# Patient Record
Sex: MALE | Race: BLACK OR AFRICAN AMERICAN | NOT HISPANIC OR LATINO | Employment: OTHER | URBAN - METROPOLITAN AREA
[De-identification: names, ages, dates, MRNs, and addresses within clinical notes are randomized per-mention and may not be internally consistent; named-entity substitution may affect disease eponyms.]

---

## 2017-09-05 ENCOUNTER — HOSPITAL ENCOUNTER (EMERGENCY)
Facility: HOSPITAL | Age: 62
Discharge: NURSING, REHAB OR LONG-TERM CARE | End: 2017-09-05
Attending: EMERGENCY MEDICINE
Payer: MEDICAID

## 2017-09-05 VITALS
WEIGHT: 216 LBS | DIASTOLIC BLOOD PRESSURE: 69 MMHG | SYSTOLIC BLOOD PRESSURE: 113 MMHG | RESPIRATION RATE: 18 BRPM | HEART RATE: 66 BPM | TEMPERATURE: 98 F | OXYGEN SATURATION: 100 %

## 2017-09-05 DIAGNOSIS — M25.521 RIGHT ELBOW PAIN: ICD-10-CM
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
## 2017-09-05 DIAGNOSIS — M25.511 RIGHT SHOULDER PAIN: ICD-10-CM

## 2017-09-05 PROCEDURE — 99284 EMERGENCY DEPT VISIT MOD MDM: CPT

## 2017-09-05 PROCEDURE — 25000003 PHARM REV CODE 250: Performed by: EMERGENCY MEDICINE

## 2017-09-05 RX ORDER — POLYETHYLENE GLYCOL 3350 17 G/17G
POWDER, FOR SOLUTION ORAL
COMMUNITY

## 2017-09-05 RX ORDER — ACETAMINOPHEN 500 MG
2 TABLET ORAL
COMMUNITY

## 2017-09-05 RX ORDER — MELOXICAM 15 MG/1
15 TABLET ORAL DAILY
COMMUNITY

## 2017-09-05 RX ORDER — ESCITALOPRAM OXALATE 10 MG/1
10 TABLET ORAL DAILY
COMMUNITY

## 2017-09-05 RX ORDER — OXCARBAZEPINE 300 MG/1
300 TABLET, FILM COATED ORAL 2 TIMES DAILY
COMMUNITY

## 2017-09-05 RX ORDER — SYRING-NEEDL,DISP,INSUL,0.3 ML 29 G X1/2"
296 SYRINGE, EMPTY DISPOSABLE MISCELLANEOUS DAILY PRN
COMMUNITY

## 2017-09-05 RX ORDER — RIFAMPIN 300 MG/1
10 CAPSULE ORAL DAILY
COMMUNITY

## 2017-09-05 RX ORDER — NAPROXEN 500 MG/1
500 TABLET ORAL 2 TIMES DAILY
COMMUNITY

## 2017-09-05 RX ORDER — GABAPENTIN 400 MG/1
800 CAPSULE ORAL NIGHTLY
COMMUNITY

## 2017-09-05 RX ORDER — FINASTERIDE 5 MG/1
5 TABLET, FILM COATED ORAL DAILY
COMMUNITY

## 2017-09-05 RX ORDER — LATANOPROST 50 UG/ML
1 SOLUTION/ DROPS OPHTHALMIC NIGHTLY
COMMUNITY

## 2017-09-05 RX ORDER — LEVETIRACETAM 500 MG/1
500 TABLET ORAL 2 TIMES DAILY
COMMUNITY

## 2017-09-05 RX ORDER — LANOLIN ALCOHOL/MO/W.PET/CERES
100 CREAM (GRAM) TOPICAL DAILY
COMMUNITY

## 2017-09-05 RX ORDER — HYDROXYZINE HYDROCHLORIDE 50 MG/1
50 TABLET, FILM COATED ORAL NIGHTLY PRN
COMMUNITY

## 2017-09-05 RX ORDER — FUROSEMIDE 40 MG/1
40 TABLET ORAL 2 TIMES DAILY
COMMUNITY

## 2017-09-05 RX ORDER — QUETIAPINE FUMARATE 50 MG/1
50 TABLET, FILM COATED ORAL NIGHTLY
COMMUNITY

## 2017-09-05 RX ORDER — BACLOFEN 20 MG/1
20 TABLET ORAL 4 TIMES DAILY
COMMUNITY

## 2017-09-05 RX ORDER — ALENDRONATE SODIUM 70 MG/1
70 TABLET ORAL
COMMUNITY

## 2017-09-05 RX ORDER — BISACODYL 10 MG
10 SUPPOSITORY, RECTAL RECTAL DAILY PRN
COMMUNITY

## 2017-09-05 RX ORDER — CYCLOBENZAPRINE HCL 10 MG
10 TABLET ORAL 3 TIMES DAILY
COMMUNITY

## 2017-09-05 RX ORDER — HYDROCODONE BITARTRATE AND ACETAMINOPHEN 5; 325 MG/1; MG/1
1 TABLET ORAL
Status: COMPLETED | OUTPATIENT
Start: 2017-09-05 | End: 2017-09-05

## 2017-09-05 RX ORDER — HYDROXYZINE HYDROCHLORIDE 25 MG/1
25 TABLET, FILM COATED ORAL 4 TIMES DAILY PRN
COMMUNITY

## 2017-09-05 RX ORDER — GABAPENTIN 400 MG/1
400 CAPSULE ORAL 2 TIMES DAILY
COMMUNITY

## 2017-09-05 RX ORDER — ACETAMINOPHEN 325 MG/1
650 TABLET ORAL EVERY 4 HOURS PRN
COMMUNITY

## 2017-09-05 RX ADMIN — HYDROCODONE BITARTRATE AND ACETAMINOPHEN 1 TABLET: 5; 325 TABLET ORAL at 06:09

## 2017-09-05 NOTE — ED PROVIDER NOTES
Encounter Date: 9/5/2017       History     Chief Complaint   Patient presents with    Fall     HPI   61-year-old man from [place] presents for evaluation of fall.  Per EMS patient slid out of low lying bed and is complaining of some right shoulder and right elbow pain.  No head trauma.  No loss of consciousness.  Pain is intermittent only with moving of his contracted right extremity or raising of the shoulder.  Review of patient's allergies indicates:  No Known Allergies  Past Medical History:   Diagnosis Date    Anxiety     Aphasia     CHF (congestive heart failure)     Constipation     Depression     GERD (gastroesophageal reflux disease)     Heterotopic ossification     History of BPH     Insomnia     Neurogenic bowel     Neuropathic pain     Right hemiplegia     Seizures     Spinal stenosis     Stroke     Subdural hemorrhage     TB lung, latent      Past Surgical History:   Procedure Laterality Date    NECK SURGERY      right knee surger       History reviewed. No pertinent family history.  Social History   Substance Use Topics    Smoking status: Current Every Day Smoker     Packs/day: 1.00     Types: Cigarettes    Smokeless tobacco: Not on file    Alcohol use No     Review of Systems  REVIEW OF SYSTEMS  CONSTITUTIONAL: Negative for fever.  HEENT:  Negative for sore throat.   HEART:   Negative for chest pain..  LUNG:  Negative for shortness of breath.  HEME: Does not bruise/bleed easily.           Physical Exam     Initial Vitals [09/05/17 0228]   BP Pulse Resp Temp SpO2   (!) 128/92 76 18 97.6 °F (36.4 °C) 97 %      MAP       104         Physical Exam  Physical Exam   Nursing note and vitals reviewed.   Constitutional: . Appears well-developed and well-nourished.   HENT:   Head: Normocephalic and atraumatic.   Eyes: EOM are normal.   Neck: Normal range of motion. Neck supple.   Cardiovascular: Normal rate.   Pulmonary/Chest: Effort normal. Clear BS b/l   Abdominal: Soft, Non tender, no  distension.   Musculoskeletal: Tenderness of the left elbow with flexion and extension.  Tenderness of the left shoulder with abduction.  No deformity noted.  Pulses are 2+ distally.  Compartments are soft.    Neurological:Alert and oriented to person, place, and time.   Psychiatric: Normal mood and affect. Behavior is normal.         ED Course   Procedures  Labs Reviewed - No data to display       X-Rays:   Independently Interpreted Readings:   Other Readings:  X-rays of right shoulder: No acute fracture dislocation.    X-rays right elbow.  Chronic calcification surrounding the elbow joint.  No acute fracture or dislocation.    Medical Decision Making:   History:   Old Medical Records: I decided to obtain old medical records.  Initial Assessment:   62 yo man with low mechanism fall from low bed with right elbow and shoulder pian. NV intact. No acute fracture or dislocation on xray. Low suspicion for occult fracture. No head trauma or cervical injury. Pt cleared to return to [place] with diagnosis of soft tissue contusion.                   ED Course      Clinical Impression:   The primary encounter diagnosis was Fall, initial encounter. Diagnoses of Right shoulder pain and Right elbow pain were also pertinent to this visit.                           Thomas Muir MD  09/11/17 0830

## 2017-09-22 ENCOUNTER — LAB VISIT (OUTPATIENT)
Dept: LAB | Facility: HOSPITAL | Age: 62
End: 2017-09-22
Attending: ORTHOPAEDIC SURGERY
Payer: MEDICAID

## 2017-09-22 DIAGNOSIS — I60.9 SUBARACHNOID HEMORRHAGE: Primary | ICD-10-CM

## 2017-09-22 DIAGNOSIS — M48.00 SPINAL STENOSIS, UNSPECIFIED REGION OTHER THAN CERVICAL: ICD-10-CM

## 2017-09-22 DIAGNOSIS — I50.9 HEART FAILURE, UNSPECIFIED: ICD-10-CM

## 2017-09-22 LAB
CK MB SERPL-MCNC: 3.7 NG/ML
CK MB SERPL-RTO: 1.5 %
CK SERPL-CCNC: 251 U/L
CK SERPL-CCNC: 251 U/L
TROPONIN I SERPL DL<=0.01 NG/ML-MCNC: <0.006 NG/ML

## 2017-09-22 PROCEDURE — 82553 CREATINE MB FRACTION: CPT

## 2017-09-22 PROCEDURE — 36415 COLL VENOUS BLD VENIPUNCTURE: CPT

## 2017-09-22 PROCEDURE — 84484 ASSAY OF TROPONIN QUANT: CPT

## 2018-08-09 DIAGNOSIS — M24.521 CONTRACTURE OF RIGHT ELBOW: Primary | ICD-10-CM

## 2018-08-09 DIAGNOSIS — M25.529 CHRONIC ELBOW PAIN: ICD-10-CM

## 2018-08-09 DIAGNOSIS — G89.29 CHRONIC ELBOW PAIN: ICD-10-CM

## 2018-08-30 ENCOUNTER — HOSPITAL ENCOUNTER (OUTPATIENT)
Dept: RADIOLOGY | Facility: OTHER | Age: 63
Discharge: HOME | End: 2018-08-30
Attending: PHYSICIAN ASSISTANT
Payer: MEDICAID

## 2018-08-30 DIAGNOSIS — M25.529 CHRONIC ELBOW PAIN: ICD-10-CM

## 2018-08-30 DIAGNOSIS — M24.521 CONTRACTURE OF RIGHT ELBOW: ICD-10-CM

## 2018-08-30 DIAGNOSIS — G89.29 CHRONIC ELBOW PAIN: ICD-10-CM

## 2018-08-30 PROCEDURE — A9585 GADOBUTROL INJECTION: HCPCS | Performed by: PHYSICIAN ASSISTANT

## 2018-08-30 PROCEDURE — 73223 MRI JOINT UPR EXTR W/O&W/DYE: CPT | Mod: 26,RT,, | Performed by: RADIOLOGY

## 2018-08-30 PROCEDURE — 25500020 PHARM REV CODE 255: Performed by: PHYSICIAN ASSISTANT

## 2018-08-30 PROCEDURE — 73223 MRI JOINT UPR EXTR W/O&W/DYE: CPT | Mod: TC,RT

## 2018-08-30 RX ORDER — GADOBUTROL 604.72 MG/ML
9 INJECTION INTRAVENOUS
Status: COMPLETED | OUTPATIENT
Start: 2018-08-30 | End: 2018-08-30

## 2018-08-30 RX ADMIN — GADOBUTROL 9 ML: 604.72 INJECTION INTRAVENOUS at 09:08
